# Patient Record
Sex: MALE | Race: WHITE | NOT HISPANIC OR LATINO | Employment: FULL TIME | URBAN - METROPOLITAN AREA
[De-identification: names, ages, dates, MRNs, and addresses within clinical notes are randomized per-mention and may not be internally consistent; named-entity substitution may affect disease eponyms.]

---

## 2023-03-30 ENCOUNTER — APPOINTMENT (OUTPATIENT)
Dept: PRIMARY CARE | Facility: CLINIC | Age: 38
End: 2023-03-30
Payer: COMMERCIAL

## 2023-05-11 ENCOUNTER — OFFICE VISIT (OUTPATIENT)
Dept: PRIMARY CARE | Facility: CLINIC | Age: 38
End: 2023-05-11
Payer: COMMERCIAL

## 2023-05-11 VITALS
OXYGEN SATURATION: 99 % | HEART RATE: 100 BPM | HEIGHT: 74 IN | BODY MASS INDEX: 32.91 KG/M2 | WEIGHT: 256.4 LBS | TEMPERATURE: 98.4 F | DIASTOLIC BLOOD PRESSURE: 100 MMHG | SYSTOLIC BLOOD PRESSURE: 150 MMHG | RESPIRATION RATE: 16 BRPM

## 2023-05-11 DIAGNOSIS — R03.0 ELEVATED BLOOD PRESSURE READING: Primary | ICD-10-CM

## 2023-05-11 DIAGNOSIS — Z00.00 ENCOUNTER FOR ANNUAL PHYSICAL EXAM: ICD-10-CM

## 2023-05-11 DIAGNOSIS — Z87.898 HISTORY OF PARESTHESIA: ICD-10-CM

## 2023-05-11 PROCEDURE — 1036F TOBACCO NON-USER: CPT | Performed by: FAMILY MEDICINE

## 2023-05-11 PROCEDURE — 99203 OFFICE O/P NEW LOW 30 MIN: CPT | Performed by: FAMILY MEDICINE

## 2023-05-11 ASSESSMENT — PATIENT HEALTH QUESTIONNAIRE - PHQ9
SUM OF ALL RESPONSES TO PHQ9 QUESTIONS 1 AND 2: 0
8. MOVING OR SPEAKING SO SLOWLY THAT OTHER PEOPLE COULD HAVE NOTICED. OR THE OPPOSITE, BEING SO FIGETY OR RESTLESS THAT YOU HAVE BEEN MOVING AROUND A LOT MORE THAN USUAL: NOT AT ALL
SUM OF ALL RESPONSES TO PHQ QUESTIONS 1-9: 1
9. THOUGHTS THAT YOU WOULD BE BETTER OFF DEAD, OR OF HURTING YOURSELF: NOT AT ALL
5. POOR APPETITE OR OVEREATING: NOT AT ALL
4. FEELING TIRED OR HAVING LITTLE ENERGY: SEVERAL DAYS
6. FEELING BAD ABOUT YOURSELF - OR THAT YOU ARE A FAILURE OR HAVE LET YOURSELF OR YOUR FAMILY DOWN: NOT AT ALL
1. LITTLE INTEREST OR PLEASURE IN DOING THINGS: NOT AT ALL
7. TROUBLE CONCENTRATING ON THINGS, SUCH AS READING THE NEWSPAPER OR WATCHING TELEVISION: NOT AT ALL
10. IF YOU CHECKED OFF ANY PROBLEMS, HOW DIFFICULT HAVE THESE PROBLEMS MADE IT FOR YOU TO DO YOUR WORK, TAKE CARE OF THINGS AT HOME, OR GET ALONG WITH OTHER PEOPLE: NOT DIFFICULT AT ALL
2. FEELING DOWN, DEPRESSED OR HOPELESS: NOT AT ALL
3. TROUBLE FALLING OR STAYING ASLEEP OR SLEEPING TOO MUCH: NOT AT ALL

## 2023-05-11 ASSESSMENT — ANXIETY QUESTIONNAIRES
7. FEELING AFRAID AS IF SOMETHING AWFUL MIGHT HAPPEN: NOT AT ALL
6. BECOMING EASILY ANNOYED OR IRRITABLE: SEVERAL DAYS
IF YOU CHECKED OFF ANY PROBLEMS ON THIS QUESTIONNAIRE, HOW DIFFICULT HAVE THESE PROBLEMS MADE IT FOR YOU TO DO YOUR WORK, TAKE CARE OF THINGS AT HOME, OR GET ALONG WITH OTHER PEOPLE: NOT DIFFICULT AT ALL
3. WORRYING TOO MUCH ABOUT DIFFERENT THINGS: NOT AT ALL
1. FEELING NERVOUS, ANXIOUS, OR ON EDGE: NOT AT ALL
4. TROUBLE RELAXING: SEVERAL DAYS
5. BEING SO RESTLESS THAT IT IS HARD TO SIT STILL: NOT AT ALL
2. NOT BEING ABLE TO STOP OR CONTROL WORRYING: NOT AT ALL
GAD7 TOTAL SCORE: 2

## 2023-05-11 NOTE — PROGRESS NOTES
"Subjective   Coleman Saxena is a 38 y.o. male who presents for Establish Care.  HPI  Toe pain b/l at the end of the day w cramping. Slightly better. Started doing a vit drink supp. Started 5 mo. No numbness or tingling. Better stretching. Does not wake up at night w pain. Tried new shoes.     Dad-w unknown skin ca. No FHx prostate or colon ca    Has not seen dr since late teenager.     Very nervous at dr appt. 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     No current facility-administered medications on file prior to visit.        Patient Health Questionnaire-9 Score: 1           Objective   BP (!) 150/100   Pulse 100   Temp 36.9 °C (98.4 °F)   Resp 16   Ht 1.867 m (6' 1.5\")   Wt 116 kg (256 lb 6.4 oz)   SpO2 99%   BMI 33.37 kg/m²    Physical Exam  General: NAD  HEENT:NCAT, PERRLA, nml OP  Neck: no cervical KAMALA  Heart: RRR no murmur, no edema   Lungs: CTA b/l, no wheeze or rhonchi   GI: abd soft, nontender, nondistended.   MSK: no c/c/e. nml gait   No foot/toe tenderness, L flatter arch compared to R   Skin: warm and dry  Psych: cooperative, appropriate affect  Neuro: speech clear. A&Ox3  Assessment/Plan   Problem List Items Addressed This Visit    None  Visit Diagnoses       Elevated blood pressure reading    -  Primary  -monitor at home x 1 mo and f/up     Encounter for annual physical exam      CPE In 1 mo w labs prior     Relevant Orders    CBC and Auto Differential    Comprehensive Metabolic Panel    Hemoglobin A1C    Lipid Panel    TSH with reflex to Free T4 if abnormal    History of paresthesia      -suspect MSK foot pain  -rec stretching and orthodocis  -check B12 and electrolytes     Relevant Orders    Vitamin B12            "

## 2023-06-09 ENCOUNTER — APPOINTMENT (OUTPATIENT)
Dept: PRIMARY CARE | Facility: CLINIC | Age: 38
End: 2023-06-09
Payer: COMMERCIAL

## 2023-07-13 ENCOUNTER — APPOINTMENT (OUTPATIENT)
Dept: PRIMARY CARE | Facility: CLINIC | Age: 38
End: 2023-07-13
Payer: COMMERCIAL

## 2024-06-28 ENCOUNTER — TELEPHONE (OUTPATIENT)
Dept: PRIMARY CARE | Facility: CLINIC | Age: 39
End: 2024-06-28

## 2024-06-28 DIAGNOSIS — Z00.00 ENCOUNTER FOR PHYSICAL EXAMINATION: Primary | ICD-10-CM

## 2024-07-03 ENCOUNTER — LAB (OUTPATIENT)
Dept: LAB | Facility: LAB | Age: 39
End: 2024-07-03

## 2024-07-03 DIAGNOSIS — Z00.00 ENCOUNTER FOR PHYSICAL EXAMINATION: ICD-10-CM

## 2024-07-03 LAB
ALBUMIN SERPL BCP-MCNC: 4.4 G/DL (ref 3.4–5)
ALP SERPL-CCNC: 71 U/L (ref 33–120)
ALT SERPL W P-5'-P-CCNC: 37 U/L (ref 10–52)
ANION GAP SERPL CALC-SCNC: 15 MMOL/L (ref 10–20)
AST SERPL W P-5'-P-CCNC: 24 U/L (ref 9–39)
BASOPHILS # BLD AUTO: 0.05 X10*3/UL (ref 0–0.1)
BASOPHILS NFR BLD AUTO: 0.9 %
BILIRUB SERPL-MCNC: 0.7 MG/DL (ref 0–1.2)
BUN SERPL-MCNC: 11 MG/DL (ref 6–23)
CALCIUM SERPL-MCNC: 9.4 MG/DL (ref 8.6–10.6)
CHLORIDE SERPL-SCNC: 98 MMOL/L (ref 98–107)
CHOLEST SERPL-MCNC: 172 MG/DL (ref 0–199)
CHOLESTEROL/HDL RATIO: 3.9
CO2 SERPL-SCNC: 25 MMOL/L (ref 21–32)
CREAT SERPL-MCNC: 0.84 MG/DL (ref 0.5–1.3)
EGFRCR SERPLBLD CKD-EPI 2021: >90 ML/MIN/1.73M*2
EOSINOPHIL # BLD AUTO: 0.27 X10*3/UL (ref 0–0.7)
EOSINOPHIL NFR BLD AUTO: 4.8 %
ERYTHROCYTE [DISTWIDTH] IN BLOOD BY AUTOMATED COUNT: 13.2 % (ref 11.5–14.5)
EST. AVERAGE GLUCOSE BLD GHB EST-MCNC: 269 MG/DL
GLUCOSE SERPL-MCNC: 330 MG/DL (ref 74–99)
HBA1C MFR BLD: 11 %
HCT VFR BLD AUTO: 43.2 % (ref 41–52)
HDLC SERPL-MCNC: 44.5 MG/DL
HGB BLD-MCNC: 13.9 G/DL (ref 13.5–17.5)
IMM GRANULOCYTES # BLD AUTO: 0.02 X10*3/UL (ref 0–0.7)
IMM GRANULOCYTES NFR BLD AUTO: 0.4 % (ref 0–0.9)
LDLC SERPL CALC-MCNC: 87 MG/DL
LYMPHOCYTES # BLD AUTO: 1.67 X10*3/UL (ref 1.2–4.8)
LYMPHOCYTES NFR BLD AUTO: 29.9 %
MCH RBC QN AUTO: 28.5 PG (ref 26–34)
MCHC RBC AUTO-ENTMCNC: 32.2 G/DL (ref 32–36)
MCV RBC AUTO: 89 FL (ref 80–100)
MONOCYTES # BLD AUTO: 0.53 X10*3/UL (ref 0.1–1)
MONOCYTES NFR BLD AUTO: 9.5 %
NEUTROPHILS # BLD AUTO: 3.05 X10*3/UL (ref 1.2–7.7)
NEUTROPHILS NFR BLD AUTO: 54.5 %
NON HDL CHOLESTEROL: 128 MG/DL (ref 0–149)
NRBC BLD-RTO: 0 /100 WBCS (ref 0–0)
PLATELET # BLD AUTO: 239 X10*3/UL (ref 150–450)
POTASSIUM SERPL-SCNC: 4.6 MMOL/L (ref 3.5–5.3)
PROT SERPL-MCNC: 7.1 G/DL (ref 6.4–8.2)
RBC # BLD AUTO: 4.88 X10*6/UL (ref 4.5–5.9)
SODIUM SERPL-SCNC: 133 MMOL/L (ref 136–145)
TRIGL SERPL-MCNC: 201 MG/DL (ref 0–149)
TSH SERPL-ACNC: 2.61 MIU/L (ref 0.44–3.98)
VLDL: 40 MG/DL (ref 0–40)
WBC # BLD AUTO: 5.6 X10*3/UL (ref 4.4–11.3)

## 2024-07-03 PROCEDURE — 83036 HEMOGLOBIN GLYCOSYLATED A1C: CPT

## 2024-07-03 PROCEDURE — 80061 LIPID PANEL: CPT

## 2024-07-03 PROCEDURE — 80053 COMPREHEN METABOLIC PANEL: CPT

## 2024-07-03 PROCEDURE — 36415 COLL VENOUS BLD VENIPUNCTURE: CPT

## 2024-07-03 PROCEDURE — 84443 ASSAY THYROID STIM HORMONE: CPT

## 2024-07-03 PROCEDURE — 85025 COMPLETE CBC W/AUTO DIFF WBC: CPT

## 2024-07-08 ENCOUNTER — TELEPHONE (OUTPATIENT)
Dept: PRIMARY CARE | Facility: CLINIC | Age: 39
End: 2024-07-08
Payer: COMMERCIAL

## 2024-07-08 NOTE — TELEPHONE ENCOUNTER
"Talked to patient, scheduled as requested, not db.   Did not give pt any detailed information per below.  Told pt:  \"Dr. Behm would like to see you this week to discuss your test results.\"   "

## 2024-07-08 NOTE — TELEPHONE ENCOUNTER
Sugar is extremely elevated and has developed diabetes.   Needs to discuss urgently this wk is possible.   Ideally needs full appt no dbl

## 2024-07-11 ENCOUNTER — OFFICE VISIT (OUTPATIENT)
Dept: PRIMARY CARE | Facility: CLINIC | Age: 39
End: 2024-07-11
Payer: COMMERCIAL

## 2024-07-11 VITALS
BODY MASS INDEX: 32.78 KG/M2 | SYSTOLIC BLOOD PRESSURE: 134 MMHG | OXYGEN SATURATION: 98 % | WEIGHT: 255.4 LBS | TEMPERATURE: 97.8 F | HEIGHT: 74 IN | RESPIRATION RATE: 16 BRPM | DIASTOLIC BLOOD PRESSURE: 86 MMHG | HEART RATE: 97 BPM

## 2024-07-11 DIAGNOSIS — E11.9 TYPE 2 DIABETES MELLITUS WITHOUT COMPLICATION, WITHOUT LONG-TERM CURRENT USE OF INSULIN (MULTI): Primary | ICD-10-CM

## 2024-07-11 DIAGNOSIS — N52.9 ERECTILE DYSFUNCTION, UNSPECIFIED ERECTILE DYSFUNCTION TYPE: ICD-10-CM

## 2024-07-11 DIAGNOSIS — Z00.00 ENCOUNTER FOR ANNUAL PHYSICAL EXAM: ICD-10-CM

## 2024-07-11 PROCEDURE — 99214 OFFICE O/P EST MOD 30 MIN: CPT | Performed by: FAMILY MEDICINE

## 2024-07-11 PROCEDURE — 99396 PREV VISIT EST AGE 40-64: CPT | Performed by: FAMILY MEDICINE

## 2024-07-11 PROCEDURE — 3048F LDL-C <100 MG/DL: CPT | Performed by: FAMILY MEDICINE

## 2024-07-11 PROCEDURE — 3079F DIAST BP 80-89 MM HG: CPT | Performed by: FAMILY MEDICINE

## 2024-07-11 PROCEDURE — 1036F TOBACCO NON-USER: CPT | Performed by: FAMILY MEDICINE

## 2024-07-11 PROCEDURE — 3075F SYST BP GE 130 - 139MM HG: CPT | Performed by: FAMILY MEDICINE

## 2024-07-11 PROCEDURE — 3046F HEMOGLOBIN A1C LEVEL >9.0%: CPT | Performed by: FAMILY MEDICINE

## 2024-07-11 RX ORDER — SILDENAFIL 25 MG/1
25 TABLET, FILM COATED ORAL DAILY PRN
Qty: 30 TABLET | Refills: 11 | Status: SHIPPED | OUTPATIENT
Start: 2024-07-11 | End: 2025-07-11

## 2024-07-11 RX ORDER — TIRZEPATIDE 2.5 MG/.5ML
2.5 INJECTION, SOLUTION SUBCUTANEOUS
Qty: 4 PEN | Refills: 11 | Status: SHIPPED | OUTPATIENT
Start: 2024-07-14 | End: 2025-07-14

## 2024-07-11 RX ORDER — METFORMIN HYDROCHLORIDE 1000 MG/1
1000 TABLET ORAL
Qty: 60 TABLET | Refills: 11 | Status: SHIPPED | OUTPATIENT
Start: 2024-07-11 | End: 2025-07-06

## 2024-07-11 ASSESSMENT — PROMIS GLOBAL HEALTH SCALE
EMOTIONAL_PROBLEMS: RARELY
RATE_SOCIAL_SATISFACTION: EXCELLENT
CARRYOUT_SOCIAL_ACTIVITIES: EXCELLENT
RATE_GENERAL_HEALTH: GOOD
RATE_MENTAL_HEALTH: EXCELLENT
RATE_QUALITY_OF_LIFE: VERY GOOD
RATE_AVERAGE_FATIGUE: MILD
CARRYOUT_PHYSICAL_ACTIVITIES: COMPLETELY
RATE_AVERAGE_PAIN: 1
RATE_PHYSICAL_HEALTH: GOOD

## 2024-07-11 NOTE — PROGRESS NOTES
"Subjective   Coleman Saxena is a 39 y.o. male who presents for Annual Exam.  HPI    Here for CPE and labs     Had L toe inf after cutting toenails, used OTC meds and healing but was very red and inflamed    Wants to make lifestyle changes, already less carbs.     More sed job.     No immed Fhx colon, prostate, thyroid ca.     Dad w DM2 Dx in 80s.   No current outpatient medications on file prior to visit.     No current facility-administered medications on file prior to visit.                  Objective   /86 (BP Location: Right arm)   Pulse 97   Temp 36.6 °C (97.8 °F)   Resp 16   Ht 1.867 m (6' 1.5\")   Wt 116 kg (255 lb 6.4 oz)   SpO2 98%   BMI 33.24 kg/m²    Physical Exam  General: NAD  HEENT:NCAT, PERRLA, nml OP, b/l TM clear   Neck: no cervical KAMALA  Heart: RRR no murmur, no edema   Lungs: CTA b/l, no wheeze or rhonchi   GI: abd soft, nontender, nondistended.   MSK: no c/c/e. nml gait   Skin: warm and dry  L medial toenail fold PIH, no warmth, erythema, drainage  Psych: cooperative, appropriate affect  Neuro: speech clear. A&Ox3  Assessment/Plan   Problem List Items Addressed This Visit    None  Visit Diagnoses       Type 2 diabetes mellitus without complication, without long-term current use of insulin (Multi)    -  Primary  -new dx A1c 11  -start metformin 1000 mg BID, 1 tab at bedtime x 1 wk then BID   -start mounjaro (no Fhx thyroid ca or MEN) 2.5 mg, titrate as tolerated  -RF endo  -RF Pharm   -f/up 1 mo  -CGM samples provided  -major needle phobia   -discussed lifestyle changes, motivated       Relevant Medications    tirzepatide (Mounjaro) 2.5 mg/0.5 mL pen injector (Start on 7/14/2024)    metFORMIN (Glucophage) 1,000 mg tablet    Other Relevant Orders    Referral to Endocrinology    Referral to Clinical Pharmacy    Encounter for annual physical exam      CPE: Discussed diet/ex changes  BMI: 33  VACC: reviewed states tdap UTD  COLON CA SCRN: 45  LABS: reviewed w pt at goal besides " aforementioned  Prostate ca scrn: 45      Erectile dysfunction, unspecified erectile dysfunction type      Exac by DM2  Start sildenafil 25 mg can take up to 100 mg if needed      Relevant Medications    sildenafil (Viagra) 25 mg tablet    Toe inf: almost resolved. Did not require abx suprisingly.

## 2024-07-15 ENCOUNTER — TELEPHONE (OUTPATIENT)
Dept: PRIMARY CARE | Facility: CLINIC | Age: 39
End: 2024-07-15
Payer: COMMERCIAL

## 2024-07-15 DIAGNOSIS — E11.9 TYPE 2 DIABETES MELLITUS WITHOUT COMPLICATION, WITHOUT LONG-TERM CURRENT USE OF INSULIN (MULTI): Primary | ICD-10-CM

## 2024-07-15 NOTE — TELEPHONE ENCOUNTER
Patient called in to say mounjaro is too expensive and he does not have any rx coverage, will continue with metformin for now, KANDIS AM

## 2024-07-16 RX ORDER — GLIPIZIDE 10 MG/1
10 TABLET ORAL DAILY
Qty: 30 TABLET | Refills: 11 | Status: SHIPPED | OUTPATIENT
Start: 2024-07-16 | End: 2025-07-16

## 2024-08-06 ENCOUNTER — APPOINTMENT (OUTPATIENT)
Dept: PHARMACY | Facility: HOSPITAL | Age: 39
End: 2024-08-06
Payer: COMMERCIAL

## 2024-08-06 DIAGNOSIS — E11.9 TYPE 2 DIABETES MELLITUS WITHOUT COMPLICATION, WITHOUT LONG-TERM CURRENT USE OF INSULIN (MULTI): ICD-10-CM

## 2024-08-06 NOTE — PROGRESS NOTES
Patient ID: Coleman Saxena is a 39 y.o. male who presents for No chief complaint on file..    Referring Provider: Behm, Brittany, DO  PCP: Brittany Behm, DO Last visit with PCP: 7/11/24 Next visit with PCP: 8/13/24      Subjective   Preferred pharmacy: [facility]   Can patient afford prescribed medications: Patient has no issues with Metformin and Glipizide but was unable to afford Mounjaro as he does not have prescription coverage     Diabetes  He has type 2 diabetes mellitus. Disease course: Newly diagnosed. Home blood sugar record trend: Patient hs needle phobia. He was given CGM samples at last PCP appointment but hasn't started using due to concern about the sensor needle. An ACE inhibitor/angiotensin II receptor blocker is not being taken.     Since being diagnosed recently with diabetes patient is motivated to make lifestyle changes to help improve diabetes and hopefully reduce or eliminate need for medications for it. He reports previously he was eating what he wanted, drinking alcohol, and not working out as much. He is now working on eating healthy, working out multiple times per week, and reducing his alcohol.      Current diet:   Overall: he is cut out carbs, eating proteins, he has had decreased appetite/eating smaller portion size, working on moderation. He has significantly cut out drinking. He is typically eating two meals per day.  Lunch: salad and/or protein with brown rice  Dinner: protein (chicken) and vegetable   Snack: popcorn, pistachios, or cottage cheese with fresh fruit   Drinks: water, occasional beer     Current exercise: bicycling 3 days/week and  for football/baseball     Patient is using: has CGM samples but not using - we discussed needing to know what his blood sugars are so needs to decide CGM vs standard glucometer   The patient is not currently checking the blood glucose.    Hypoglycemia frequency: none  Hypoglycemia awareness: No     Objective     Primary/Secondary  "Prevention   - Statin? No  - ACE-I/ARB? No  - Aspirin? No    Pertinent PMH Review:  - PMH of Pancreatitis/gastroparesis: No  - PMH of Retinopathy: No  - PMH of Urinary Tract Infections: No  - PMH of MTC: No      There were no vitals taken for this visit.   BP Readings from Last 4 Encounters:   07/11/24 134/86   05/11/23 (!) 150/100      There were no vitals filed for this visit.     Labs  Lab Results   Component Value Date    BILITOT 0.7 07/03/2024    CALCIUM 9.4 07/03/2024    CO2 25 07/03/2024    CL 98 07/03/2024    CREATININE 0.84 07/03/2024    GLUCOSE 330 (H) 07/03/2024    ALKPHOS 71 07/03/2024    K 4.6 07/03/2024    PROT 7.1 07/03/2024     (L) 07/03/2024    AST 24 07/03/2024    ALT 37 07/03/2024    BUN 11 07/03/2024    ANIONGAP 15 07/03/2024    ALBUMIN 4.4 07/03/2024     Lab Results   Component Value Date    TRIG 201 (H) 07/03/2024    CHOL 172 07/03/2024    LDLCALC 87 07/03/2024    HDL 44.5 07/03/2024     Lab Results   Component Value Date    HGBA1C 11.0 (H) 07/03/2024       Current Outpatient Medications   Medication Instructions    glipiZIDE (GLUCOTROL) 10 mg, oral, Daily    metFORMIN (GLUCOPHAGE) 1,000 mg, oral, 2 times daily (morning and late afternoon)    Mounjaro 2.5 mg, subcutaneous, Once Weekly    sildenafil (VIAGRA) 25 mg, oral, Daily PRN         Drug Interactions;  None at time of review    Patient Education  - Educated/reiterated HbA1c/blood sugar goals   - Educated patient hypoglycemia/hyperglycemia symptoms and on \"Rule of 15\"    Assessment/Plan   Diagnoses and all orders for this visit:    Type 2 diabetes mellitus without complication, without long-term current use of insulin (Multi)  Patient is newly diagnosed an uncontrolled with HbA1c of 11% (goal <7%). He was started on Metformin and Glipizide (Could not afford Mounjaro due to no insurance) and is tolerating well. He is not currently checking his blood sugar but has CGM samples. He reports having needle phobia and is concerned about the " sensor. Discussed that we need to monitor his blood sugars given that he is uncontrolled. We discuss either using the CGM or testing his blood sugar via regular glucometer. Patient agreeable to think about the CGM. He is very motivated to make lifestyle changes to reduce/eliminate medication for diabetes. Given his resistance to CGM/glucometer and needle phobia, won't make any changes today. Will follow up with patient regarding CGM in a week.   1. CONTINUE Metformin 1 g BID and Glipizide 10 mg daily    2. START monitoring blood glucose via CGM      Follow-up: 8/14/24 @ 3 pm      Time spent with pt: Total length of time 50 (minutes) of the encounter and more than 50% was spent counseling the patient.    Janice Calderon, PharmD    Continue all meds under the continuation of care with the referring provider and clinical pharmacy team.

## 2024-08-13 ENCOUNTER — APPOINTMENT (OUTPATIENT)
Dept: PRIMARY CARE | Facility: CLINIC | Age: 39
End: 2024-08-13
Payer: COMMERCIAL

## 2024-08-13 VITALS
HEART RATE: 95 BPM | HEIGHT: 74 IN | DIASTOLIC BLOOD PRESSURE: 84 MMHG | BODY MASS INDEX: 33.06 KG/M2 | OXYGEN SATURATION: 98 % | SYSTOLIC BLOOD PRESSURE: 140 MMHG | RESPIRATION RATE: 16 BRPM | WEIGHT: 257.6 LBS

## 2024-08-13 DIAGNOSIS — N52.9 ERECTILE DYSFUNCTION, UNSPECIFIED ERECTILE DYSFUNCTION TYPE: ICD-10-CM

## 2024-08-13 DIAGNOSIS — E11.9 TYPE 2 DIABETES MELLITUS WITHOUT COMPLICATION, WITHOUT LONG-TERM CURRENT USE OF INSULIN (MULTI): Primary | ICD-10-CM

## 2024-08-13 DIAGNOSIS — L60.8 SUBUNGUAL HEMORRHAGE: ICD-10-CM

## 2024-08-13 PROCEDURE — 3077F SYST BP >= 140 MM HG: CPT | Performed by: FAMILY MEDICINE

## 2024-08-13 PROCEDURE — 3046F HEMOGLOBIN A1C LEVEL >9.0%: CPT | Performed by: FAMILY MEDICINE

## 2024-08-13 PROCEDURE — 99213 OFFICE O/P EST LOW 20 MIN: CPT | Performed by: FAMILY MEDICINE

## 2024-08-13 PROCEDURE — 1036F TOBACCO NON-USER: CPT | Performed by: FAMILY MEDICINE

## 2024-08-13 PROCEDURE — 3008F BODY MASS INDEX DOCD: CPT | Performed by: FAMILY MEDICINE

## 2024-08-13 PROCEDURE — 3079F DIAST BP 80-89 MM HG: CPT | Performed by: FAMILY MEDICINE

## 2024-08-13 PROCEDURE — 3048F LDL-C <100 MG/DL: CPT | Performed by: FAMILY MEDICINE

## 2024-08-13 NOTE — PROGRESS NOTES
"Subjective   Coleman Saxena is a 39 y.o. male who presents for Follow-up and Toe Problem (Left big toe bruising under nail ).  HPI    F/up on recent DM2 Dx in July w A1c 11  RF to pharm   Started metformin/glipizide   Mounjaro too $$   Occ nausea  Making MAJOR diet changes. Very little   Has felt shaky once when not eating as much   Busy w coaching sons football  Doing some peloton     Some bruising under nail where injury occurred.     Viagra 50 mg not effective.     Current Outpatient Medications on File Prior to Visit   Medication Sig Dispense Refill    glipiZIDE (Glucotrol) 10 mg tablet Take 1 tablet (10 mg) by mouth once daily. 30 tablet 11    metFORMIN (Glucophage) 1,000 mg tablet Take 1 tablet (1,000 mg) by mouth 2 times daily (morning and late afternoon). 60 tablet 11    sildenafil (Viagra) 25 mg tablet Take 1 tablet (25 mg) by mouth once daily as needed for erectile dysfunction (take 30-60 min before sexual activity). 30 tablet 11    [DISCONTINUED] tirzepatide (Mounjaro) 2.5 mg/0.5 mL pen injector Inject 2.5 mg under the skin 1 (one) time per week. (Patient not taking: Reported on 8/6/2024) 4 Pen 11     No current facility-administered medications on file prior to visit.                  Objective   /84 (BP Location: Left arm)   Pulse 95   Resp 16   Ht 1.867 m (6' 1.5\")   Wt 117 kg (257 lb 9.6 oz)   SpO2 98%   BMI 33.53 kg/m²    Physical Exam  General: NAD  HEENT:NCAT, PERRLA, nml OP  Neck: no cervical KAMALA  Heart: RRR no murmur, no edema   Lungs: CTA b/l, no wheeze or rhonchi   MSK: no c/c/e. nml gait   L mid subungal dark red dried blood  Skin: warm and dry  Psych: cooperative, appropriate affect  Neuro: speech clear. A&Ox3  Assessment/Plan   Problem List Items Addressed This Visit    None  Visit Diagnoses       Type 2 diabetes mellitus without complication, without long-term current use of insulin (Multi)    -  Primary  Unable to tolerate CGM or accuchecks d/t needle phobia so unable to " assess improved since starting Rx for new Dx uncontrolled DM2 at 11.0   Tolerating meds well  A1c/BMP in 6-8 wk   F/up pharm tomorrow       Relevant Orders    Basic Metabolic Panel    Hemoglobin A1C    Erectile dysfunction, unspecified erectile dysfunction type      Not much improved w silden 50, can titrate dose to 75 then 100  If still no improvement will RF uro       Subungual hemorrhage      Anticipate improvement w toenail growth

## 2024-08-14 ENCOUNTER — APPOINTMENT (OUTPATIENT)
Dept: PHARMACY | Facility: HOSPITAL | Age: 39
End: 2024-08-14
Payer: COMMERCIAL

## 2024-08-15 ENCOUNTER — TELEMEDICINE (OUTPATIENT)
Dept: PHARMACY | Facility: HOSPITAL | Age: 39
End: 2024-08-15
Payer: COMMERCIAL

## 2024-08-15 DIAGNOSIS — E11.9 TYPE 2 DIABETES MELLITUS WITHOUT COMPLICATION, WITHOUT LONG-TERM CURRENT USE OF INSULIN (MULTI): Primary | ICD-10-CM

## 2024-08-15 NOTE — PROGRESS NOTES
Patient ID: Coleman Saxena is a 39 y.o. male who presents for No chief complaint on file..    Referring Provider: Behm, Brittany, DO  PCP: Brittany Behm, DO Last visit with PCP: 8/13/24 Next visit with PCP: 10/17/24      Subjective   Preferred pharmacy: [facility]   Can patient afford prescribed medications: Patient has no issues with Metformin and Glipizide but was unable to afford Mounjaro as he does not have prescription coverage     Diabetes  He has type 2 diabetes mellitus. Disease course: Newly diagnosed. Home blood sugar record trend: Patient has severe needle phobia. He was given CGM samples at last PCP appointment but hasn't used due to concern about the sensor needle. An ACE inhibitor/angiotensin II receptor blocker is not being taken.     Since being diagnosed recently with diabetes patient is motivated to make lifestyle changes to help improve diabetes and hopefully reduce or eliminate need for medications for it. He reports previously he was eating what he wanted, drinking alcohol, and not working out as much. He is now working on eating healthy, working out multiple times per week, and reducing his alcohol.      Current diet:   Overall: he is cut out carbs, eating proteins, he has had decreased appetite/eating smaller portion size, working on moderation. He has significantly cut out drinking. He is typically eating two meals per day.  Lunch: salad and/or protein with brown rice  Dinner: protein (chicken) and vegetable   Snack: popcorn, pistachios, or cottage cheese with fresh fruit   Drinks: water, occasional beer     Current exercise: bicycling 3 days/week and  for football/baseball     Patient is using: has CGM samples but not using - patient unwilling to use CGM or regular glucometer to monitor blood sugars   The patient is not currently checking the blood glucose.    Hypoglycemia frequency: none  Hypoglycemia awareness: No     Objective     Primary/Secondary Prevention   - Statin?  "No  - ACE-I/ARB? No  - Aspirin? No    Pertinent PMH Review:  - PMH of Pancreatitis/gastroparesis: No  - PMH of Retinopathy: No  - PMH of Urinary Tract Infections: No  - PMH of MTC: No      There were no vitals taken for this visit.   BP Readings from Last 4 Encounters:   08/13/24 140/84   07/11/24 134/86   05/11/23 (!) 150/100      There were no vitals filed for this visit.     Labs  Lab Results   Component Value Date    BILITOT 0.7 07/03/2024    CALCIUM 9.4 07/03/2024    CO2 25 07/03/2024    CL 98 07/03/2024    CREATININE 0.84 07/03/2024    GLUCOSE 330 (H) 07/03/2024    ALKPHOS 71 07/03/2024    K 4.6 07/03/2024    PROT 7.1 07/03/2024     (L) 07/03/2024    AST 24 07/03/2024    ALT 37 07/03/2024    BUN 11 07/03/2024    ANIONGAP 15 07/03/2024    ALBUMIN 4.4 07/03/2024     Lab Results   Component Value Date    TRIG 201 (H) 07/03/2024    CHOL 172 07/03/2024    LDLCALC 87 07/03/2024    HDL 44.5 07/03/2024     Lab Results   Component Value Date    HGBA1C 11.0 (H) 07/03/2024       Current Outpatient Medications   Medication Instructions    glipiZIDE (GLUCOTROL) 10 mg, oral, Daily    metFORMIN (GLUCOPHAGE) 1,000 mg, oral, 2 times daily (morning and late afternoon)    sildenafil (VIAGRA) 25 mg, oral, Daily PRN         Drug Interactions;  None at time of review    Patient Education  - Educated/reiterated HbA1c/blood sugar goals   - Educated patient hypoglycemia/hyperglycemia symptoms and on \"Rule of 15\"    Assessment/Plan   Diagnoses and all orders for this visit:    Type 2 diabetes mellitus without complication, without long-term current use of insulin (Multi)  Patient is newly diagnosed an uncontrolled with HbA1c of 11% (goal <7%). He was started on Metformin and Glipizide (Could not afford Mounjaro due to no insurance) and is tolerating well. He is not currently checking his blood sugar but has CGM samples. He reports having needle phobia is unwilling to check blood sugars at this time. He discussed with his PCP and " the plan will be to monitor diabetes through HbA1c in October. Given he is motivated and making significant lifestyle changes and his severe needle phobia, will plan to follow up with him after HbA1c has been updated. Patient agreeable to this plan. Discussed that if his HbA1c did not improve at next check, recommend finding a way to check his blood sugars so he can be managed more closely and regimen can be adjusted. Patient acknowledged. Educated him to call if has any questions or concerns before next visit.   1. CONTINUE Metformin 1 g BID and Glipizide 10 mg daily        Follow-up: 10/18/24 @ 3 pm      Time spent with pt: Total length of time 15 (minutes) of the encounter and more than 50% was spent counseling the patient.    Janice Calderon, PharmD    Continue all meds under the continuation of care with the referring provider and clinical pharmacy team.

## 2024-09-24 ENCOUNTER — APPOINTMENT (OUTPATIENT)
Dept: PRIMARY CARE | Facility: CLINIC | Age: 39
End: 2024-09-24

## 2024-10-15 ENCOUNTER — LAB (OUTPATIENT)
Dept: LAB | Facility: LAB | Age: 39
End: 2024-10-15

## 2024-10-15 DIAGNOSIS — E11.9 TYPE 2 DIABETES MELLITUS WITHOUT COMPLICATION, WITHOUT LONG-TERM CURRENT USE OF INSULIN (MULTI): ICD-10-CM

## 2024-10-15 LAB
ANION GAP SERPL CALC-SCNC: 14 MMOL/L (ref 10–20)
BUN SERPL-MCNC: 17 MG/DL (ref 6–23)
CALCIUM SERPL-MCNC: 9.6 MG/DL (ref 8.6–10.6)
CHLORIDE SERPL-SCNC: 99 MMOL/L (ref 98–107)
CO2 SERPL-SCNC: 28 MMOL/L (ref 21–32)
CREAT SERPL-MCNC: 0.84 MG/DL (ref 0.5–1.3)
EGFRCR SERPLBLD CKD-EPI 2021: >90 ML/MIN/1.73M*2
EST. AVERAGE GLUCOSE BLD GHB EST-MCNC: 117 MG/DL
GLUCOSE SERPL-MCNC: 192 MG/DL (ref 74–99)
HBA1C MFR BLD: 5.7 %
POTASSIUM SERPL-SCNC: 4.8 MMOL/L (ref 3.5–5.3)
SODIUM SERPL-SCNC: 136 MMOL/L (ref 136–145)

## 2024-10-15 PROCEDURE — 36415 COLL VENOUS BLD VENIPUNCTURE: CPT

## 2024-10-15 PROCEDURE — 80048 BASIC METABOLIC PNL TOTAL CA: CPT

## 2024-10-15 PROCEDURE — 83036 HEMOGLOBIN GLYCOSYLATED A1C: CPT

## 2024-10-17 ENCOUNTER — APPOINTMENT (OUTPATIENT)
Dept: PRIMARY CARE | Facility: CLINIC | Age: 39
End: 2024-10-17
Payer: COMMERCIAL

## 2024-10-17 VITALS
HEART RATE: 96 BPM | HEIGHT: 74 IN | RESPIRATION RATE: 16 BRPM | BODY MASS INDEX: 32.8 KG/M2 | SYSTOLIC BLOOD PRESSURE: 118 MMHG | WEIGHT: 255.6 LBS | TEMPERATURE: 97.5 F | OXYGEN SATURATION: 96 % | DIASTOLIC BLOOD PRESSURE: 74 MMHG

## 2024-10-17 DIAGNOSIS — N52.9 ERECTILE DYSFUNCTION, UNSPECIFIED ERECTILE DYSFUNCTION TYPE: ICD-10-CM

## 2024-10-17 DIAGNOSIS — E11.9 TYPE 2 DIABETES MELLITUS WITHOUT COMPLICATION, WITHOUT LONG-TERM CURRENT USE OF INSULIN (MULTI): Primary | ICD-10-CM

## 2024-10-17 PROCEDURE — 3008F BODY MASS INDEX DOCD: CPT | Performed by: FAMILY MEDICINE

## 2024-10-17 PROCEDURE — 99213 OFFICE O/P EST LOW 20 MIN: CPT | Performed by: FAMILY MEDICINE

## 2024-10-17 PROCEDURE — 3044F HG A1C LEVEL LT 7.0%: CPT | Performed by: FAMILY MEDICINE

## 2024-10-17 PROCEDURE — 3078F DIAST BP <80 MM HG: CPT | Performed by: FAMILY MEDICINE

## 2024-10-17 PROCEDURE — 3048F LDL-C <100 MG/DL: CPT | Performed by: FAMILY MEDICINE

## 2024-10-17 PROCEDURE — 1036F TOBACCO NON-USER: CPT | Performed by: FAMILY MEDICINE

## 2024-10-17 PROCEDURE — 3074F SYST BP LT 130 MM HG: CPT | Performed by: FAMILY MEDICINE

## 2024-10-17 NOTE — PROGRESS NOTES
"Subjective   Coleman Saxena is a 39 y.o. male who presents for Follow-up.  HPI    Here for followup on DM2. FEELING GREAT, better energy    No improvement in ED w sildenafil 100    Current Outpatient Medications on File Prior to Visit   Medication Sig Dispense Refill    glipiZIDE (Glucotrol) 10 mg tablet Take 1 tablet (10 mg) by mouth once daily. 30 tablet 11    metFORMIN (Glucophage) 1,000 mg tablet Take 1 tablet (1,000 mg) by mouth 2 times daily (morning and late afternoon). 60 tablet 11    sildenafil (Viagra) 25 mg tablet Take 1 tablet (25 mg) by mouth once daily as needed for erectile dysfunction (take 30-60 min before sexual activity). 30 tablet 11     No current facility-administered medications on file prior to visit.                  Objective   /74   Pulse 96   Temp 36.4 °C (97.5 °F)   Resp 16   Ht 1.867 m (6' 1.5\")   Wt 116 kg (255 lb 9.6 oz)   SpO2 96%   BMI 33.27 kg/m²    Physical Exam  General: NAD  HEENT:NCAT, PERRLA, nml OP  Neck: no cervical KAMALA  Heart: RRR no murmur  Lungs: CTA b/l, no wheeze or rhonchi   MSK: no c/c/e. nml gait   Skin: warm and dry  Psych: cooperative, appropriate affect  Neuro: speech clear. A&Ox3  Assessment/Plan   Problem List Items Addressed This Visit    None  Visit Diagnoses       Type 2 diabetes mellitus without complication, without long-term current use of insulin (Multi)    -  Primary  EXCELLENT progress w A1c from 11 to 5.7 in 3 mo!  Since glu still 190s will cont current Rx  Rpt labs 3-6 mo      Relevant Orders    Basic Metabolic Panel    Hemoglobin A1C    Lipid Panel    Erectile dysfunction, unspecified erectile dysfunction type      No improvement w sildenafil  RF to uro   ?DM2 related consequence     Relevant Orders    Referral to Urology            "

## 2024-10-18 ENCOUNTER — APPOINTMENT (OUTPATIENT)
Dept: PHARMACY | Facility: HOSPITAL | Age: 39
End: 2024-10-18
Payer: COMMERCIAL

## 2024-10-18 DIAGNOSIS — E11.9 TYPE 2 DIABETES MELLITUS WITHOUT COMPLICATION, WITHOUT LONG-TERM CURRENT USE OF INSULIN (MULTI): Primary | ICD-10-CM

## 2024-10-18 NOTE — PROGRESS NOTES
Patient ID: Coleman Saxena is a 39 y.o. male who presents for Diabetes.    Referring Provider: Behm, Brittany, DO  PCP: Brittany Behm, DO Last visit with PCP: 10/17/24 Next visit with PCP: 2/11/24      Subjective   Preferred pharmacy: [facility]   Can patient afford prescribed medications: Patient has no issues with Metformin and Glipizide but was unable to afford Mounjaro as he does not have prescription coverage     Diabetes  He has type 2 diabetes mellitus. Disease course: Newly diagnosed in July HbA1c down from 11% to 5.7% Current diabetic treatments: metformin 1 g BID and glipizide 10 mg daily. He is tolerating medications well without side effects. Per PCP, plan is to monitor HbA1c in February if everything continuing to improve may stop glipizide but left it on for now as BG still elevated on labwork. Home blood sugar record trend: Patient has severe needle phobia. He was given CGM samples at last PCP appointment but hasn't used due to concern about the sensor needle. An ACE inhibitor/angiotensin II receptor blocker is not being taken.     Since being diagnosed recently with diabetes patient is motivated to make lifestyle changes to help improve diabetes and hopefully reduce or eliminate need for medications for it. He reports previously he was eating what he wanted, drinking alcohol, and not working out as much. He is now working on eating healthy, working out multiple times per week, and reducing his alcohol.        Current diet:   Overall: he is cut out carbs, eating proteins, he has had decreased appetite/eating smaller portion size, working on moderation. He has significantly cut out drinking. He is typically eating two meals per day.  Lunch: salad and/or protein with brown rice  Dinner: protein (chicken) and vegetable   Snack: popcorn, pistachios, or cottage cheese with fresh fruit   Drinks: water, occasional beer     Current exercise: bicycling 3 days/week and  for football/baseball  "    Patient is using: has CGM samples but not using - patient unwilling to use CGM or regular glucometer to monitor blood sugars   The patient is not currently checking the blood glucose.    Hypoglycemia frequency: none  Hypoglycemia awareness: No     Objective     Primary/Secondary Prevention   - Statin? No  - ACE-I/ARB? No  - Aspirin? No    Pertinent PMH Review:  - PMH of Pancreatitis/gastroparesis: No  - PMH of Retinopathy: No  - PMH of Urinary Tract Infections: No  - PMH of MTC: No      There were no vitals taken for this visit.   BP Readings from Last 4 Encounters:   10/17/24 118/74   08/13/24 140/84   07/11/24 134/86   05/11/23 (!) 150/100      There were no vitals filed for this visit.     Labs  Lab Results   Component Value Date    BILITOT 0.7 07/03/2024    CALCIUM 9.6 10/15/2024    CO2 28 10/15/2024    CL 99 10/15/2024    CREATININE 0.84 10/15/2024    GLUCOSE 192 (H) 10/15/2024    ALKPHOS 71 07/03/2024    K 4.8 10/15/2024    PROT 7.1 07/03/2024     10/15/2024    AST 24 07/03/2024    ALT 37 07/03/2024    BUN 17 10/15/2024    ANIONGAP 14 10/15/2024    ALBUMIN 4.4 07/03/2024     Lab Results   Component Value Date    TRIG 201 (H) 07/03/2024    CHOL 172 07/03/2024    LDLCALC 87 07/03/2024    HDL 44.5 07/03/2024     Lab Results   Component Value Date    HGBA1C 5.7 (H) 10/15/2024       Current Outpatient Medications   Medication Instructions    glipiZIDE (GLUCOTROL) 10 mg, oral, Daily    metFORMIN (GLUCOPHAGE) 1,000 mg, oral, 2 times daily (morning and late afternoon)    sildenafil (VIAGRA) 25 mg, oral, Daily PRN         Drug Interactions;  None at time of review    Patient Education  - Educated/reiterated HbA1c/blood sugar goals   - Educated patient hypoglycemia/hyperglycemia symptoms and on \"Rule of 15\"    Assessment/Plan   Diagnoses and all orders for this visit:    Type 2 diabetes mellitus without complication, without long-term current use of insulin (Multi)  Patient has made significant improvement in " a couple of months and HbA1c is now controlled at 5.7%. Per PCP, plan to continue metformin and glipizide for now and monitor labs/progress in February to determine if can eliminate glipizide (holding off currently due to evaluated glucose on lab work). Agree plan, will follow up with patient after labs done in February. Educated patient that if he continues to do well/improve and notices he starts feeling symptoms of low blood sugars to call. Patient acknowledged.     1. CONTINUE Metformin 1 g BID and Glipizide 10 mg daily        Follow-up: 2/18/24 @ 3 pm      Time spent with pt: Total length of time 15 (minutes) of the encounter and more than 50% was spent counseling the patient.    Janice Calderon, PharmD    Continue all meds under the continuation of care with the referring provider and clinical pharmacy team.

## 2025-02-11 ENCOUNTER — APPOINTMENT (OUTPATIENT)
Dept: PRIMARY CARE | Facility: CLINIC | Age: 40
End: 2025-02-11

## 2025-02-11 VITALS
SYSTOLIC BLOOD PRESSURE: 118 MMHG | HEART RATE: 94 BPM | OXYGEN SATURATION: 98 % | DIASTOLIC BLOOD PRESSURE: 82 MMHG | TEMPERATURE: 95.3 F | HEIGHT: 74 IN | BODY MASS INDEX: 34.65 KG/M2 | RESPIRATION RATE: 16 BRPM | WEIGHT: 270 LBS

## 2025-02-11 DIAGNOSIS — H00.011 HORDEOLUM EXTERNUM OF RIGHT UPPER EYELID: Primary | ICD-10-CM

## 2025-02-11 DIAGNOSIS — E11.9 TYPE 2 DIABETES MELLITUS WITHOUT COMPLICATION, WITHOUT LONG-TERM CURRENT USE OF INSULIN (MULTI): ICD-10-CM

## 2025-02-11 LAB
ANION GAP SERPL CALCULATED.4IONS-SCNC: 10 MMOL/L (CALC) (ref 7–17)
BUN SERPL-MCNC: 12 MG/DL (ref 7–25)
BUN/CREAT SERPL: ABNORMAL (CALC) (ref 6–22)
CALCIUM SERPL-MCNC: 9.4 MG/DL (ref 8.6–10.3)
CHLORIDE SERPL-SCNC: 100 MMOL/L (ref 98–110)
CHOLEST SERPL-MCNC: 169 MG/DL
CHOLEST/HDLC SERPL: 3.8 (CALC)
CO2 SERPL-SCNC: 27 MMOL/L (ref 20–32)
CREAT SERPL-MCNC: 0.78 MG/DL (ref 0.6–1.29)
EGFRCR SERPLBLD CKD-EPI 2021: 116 ML/MIN/1.73M2
EST. AVERAGE GLUCOSE BLD GHB EST-MCNC: 163 MG/DL
EST. AVERAGE GLUCOSE BLD GHB EST-SCNC: 9 MMOL/L
GLUCOSE SERPL-MCNC: 217 MG/DL (ref 65–99)
HBA1C MFR BLD: 7.3 % OF TOTAL HGB
HDLC SERPL-MCNC: 44 MG/DL
LDLC SERPL CALC-MCNC: 99 MG/DL (CALC)
NONHDLC SERPL-MCNC: 125 MG/DL (CALC)
POTASSIUM SERPL-SCNC: 4.4 MMOL/L (ref 3.5–5.3)
SODIUM SERPL-SCNC: 137 MMOL/L (ref 135–146)
TRIGL SERPL-MCNC: 166 MG/DL

## 2025-02-11 PROCEDURE — 3079F DIAST BP 80-89 MM HG: CPT | Performed by: FAMILY MEDICINE

## 2025-02-11 PROCEDURE — 99214 OFFICE O/P EST MOD 30 MIN: CPT | Performed by: FAMILY MEDICINE

## 2025-02-11 PROCEDURE — 3008F BODY MASS INDEX DOCD: CPT | Performed by: FAMILY MEDICINE

## 2025-02-11 PROCEDURE — 1036F TOBACCO NON-USER: CPT | Performed by: FAMILY MEDICINE

## 2025-02-11 PROCEDURE — 3074F SYST BP LT 130 MM HG: CPT | Performed by: FAMILY MEDICINE

## 2025-02-11 RX ORDER — ERYTHROMYCIN 5 MG/G
OINTMENT OPHTHALMIC 4 TIMES DAILY
Qty: 3.5 G | Refills: 0 | Status: SHIPPED | OUTPATIENT
Start: 2025-02-11 | End: 2025-02-18

## 2025-02-11 ASSESSMENT — PATIENT HEALTH QUESTIONNAIRE - PHQ9
SUM OF ALL RESPONSES TO PHQ9 QUESTIONS 1 AND 2: 0
2. FEELING DOWN, DEPRESSED OR HOPELESS: NOT AT ALL
1. LITTLE INTEREST OR PLEASURE IN DOING THINGS: NOT AT ALL

## 2025-02-11 NOTE — PROGRESS NOTES
"Subjective   Coleman Saxena is a 39 y.o. male who presents for Follow-up.  HPI    Attributes sugar inc to holidays and sports games. Does not check sugar regularly. Has gained wt.     Episode of eye lid swelling in nov and cyst that popped and drained but still red bump. Not painful.   Current Outpatient Medications on File Prior to Visit   Medication Sig Dispense Refill    glipiZIDE (Glucotrol) 10 mg tablet Take 1 tablet (10 mg) by mouth once daily. 30 tablet 11    metFORMIN (Glucophage) 1,000 mg tablet Take 1 tablet (1,000 mg) by mouth 2 times daily (morning and late afternoon). 60 tablet 11    sildenafil (Viagra) 25 mg tablet Take 1 tablet (25 mg) by mouth once daily as needed for erectile dysfunction (take 30-60 min before sexual activity). 30 tablet 11     No current facility-administered medications on file prior to visit.                  Objective   /82   Pulse 94   Temp 35.2 °C (95.3 °F)   Resp 16   Ht 1.867 m (6' 1.5\")   Wt 122 kg (270 lb)   SpO2 98%   BMI 35.14 kg/m²    Physical Exam  General: NAD  HEENT:NCAT, PERRLA, R upper eyelid erythema, no cyst, not tender  Neck: no cervical KAMALA  Heart: RRR no murmur, no edema   Lungs: CTA b/l, no wheeze or rhonchi   MSK: no c/c/e. nml gait   Skin: warm and dry  Psych: cooperative, appropriate affect  Neuro: speech clear. A&Ox3  Assessment/Plan   Problem List Items Addressed This Visit    None  Visit Diagnoses       Hordeolum externum of right upper eyelid    -  Primary  Tx topical erythromycin but given that its been present for several mo I am concerned that it has become chronic and would need optho f/up if no improvement. RF placed     Relevant Medications    erythromycin (Romycin) 5 mg/gram (0.5 %) ophthalmic ointment    Other Relevant Orders    Referral to Ophthalmology    Type 2 diabetes mellitus without complication, without long-term current use of insulin (Multi)      Uncontrolled  A1c inc w fasting sugar in 200s   Start ozempic 0.25, " sample provided   No contraind (no Fhx thyrid ca or MEN)  F/up 1 mo   Cont metformin 1000 BID and glipizde 10       Relevant Medications    semaglutide 0.25 mg or 0.5 mg (2 mg/3 mL) pen injector

## 2025-02-18 ENCOUNTER — APPOINTMENT (OUTPATIENT)
Dept: PHARMACY | Facility: HOSPITAL | Age: 40
End: 2025-02-18

## 2025-03-12 ENCOUNTER — APPOINTMENT (OUTPATIENT)
Dept: PRIMARY CARE | Facility: CLINIC | Age: 40
End: 2025-03-12
Payer: COMMERCIAL

## 2025-07-21 DIAGNOSIS — E11.9 TYPE 2 DIABETES MELLITUS WITHOUT COMPLICATION, WITHOUT LONG-TERM CURRENT USE OF INSULIN: ICD-10-CM

## 2025-07-21 RX ORDER — GLIPIZIDE 10 MG/1
10 TABLET ORAL DAILY
Qty: 90 TABLET | Refills: 3 | Status: SHIPPED | OUTPATIENT
Start: 2025-07-21

## 2025-07-21 RX ORDER — METFORMIN HYDROCHLORIDE 1000 MG/1
1000 TABLET ORAL
Qty: 180 TABLET | Refills: 3 | Status: SHIPPED | OUTPATIENT
Start: 2025-07-21 | End: 2026-07-16